# Patient Record
(demographics unavailable — no encounter records)

---

## 2024-10-08 NOTE — REASON FOR VISIT
[Follow Up] : a follow up visit [Patient] : patient [Father] : father Acitretin Pregnancy And Lactation Text: This medication is Pregnancy Category X and should not be given to women who are pregnant or may become pregnant in the future. This medication is excreted in breast milk.

## 2024-11-19 NOTE — PHYSICAL EXAM
[FreeTextEntry1] : Healthy appearing 11-year-old child. Awake, alert, in no acute distress. Pleasant and cooperative. Eyes are clear with no sclera abnormalities. External ears, nose and mouth are clear. Good respiratory effort with no audible wheezing without use of a stethoscope. Ambulates independently. Good mobility following surgery, good coordination and balance.  Spine: Incision appears to be well healed, no active drainage, fluid, or signs of infection. No surrounding erythema. No induration or fluctuance. LE SILT distally. Negative Saurabh. DP 2+. BCR in all digits. The surgical incision site(s) was clean, dry and intact, not erythematous, absent of discharge and not swollen.

## 2024-11-19 NOTE — ASSESSMENT
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 He has been experiencing some back discomfort for approximately 2 months.   The history was obtained today from the child and parent; given the patient's age and/or the child's mental capacity, the history was unreliable and the parent was used as an independent historian.  Radiographs today confirm hardware is in place, no evidence of failure or dislodgement, though there is some worsening in the thoracolumbar curve. Recommendation at this time is to obtain an MRI of his lumbar and thoracic spine to assess the anatomy and to determine if there are issues on the implant pelvis interface. Study was ordered at today's visit and our office will reach out to patient's family with insurance authorization. At this time, he may continue use of NSAIDs/Tylenol prn for pain. Gentle massage or heat may also be helpful. There is also some further stressing of the implant interface on the pelvis because he is kyphotic in that region. Due to his constant forward-pitched posture, we may consider TLSO bracing for correction and to improve his sagittal plane parameters which may help with his pain. We will see him back after the MRI is obtained to do results and depending on what imaging shows, we will consider completing an ~1cm lengthening at that time.  All questions and concerns were addressed today. Parent and patient verbalize understanding and agree with plan of care.  Shameka JOHNS PA-C, have acted as a scribe and documented the above information for Dr. Nunez.

## 2024-11-19 NOTE — HISTORY OF PRESENT ILLNESS
[FreeTextEntry1] : The patient is an 11-year-old male who has a history of congenital kyphoscoliosis, had undergone MAGEC gracia instrumentation, has undergone repeated lengthening, had done well until dislodgement of the right-sided gracia. Because of the dislodgement and potential weakening of the implant, it was felt that he would benefit from replacement of the gracia, revision fixation with VersaTie to prevent dislodgement proximally and good fixation onto the pelvic segment to allow for adequate distraction and correction as he continues to grow. He was indicated for revision because of the significant irritation in the loss of correction and underwent the procedure on 4/1/24. He is 7.5 months out.  Today, Shweta presents today with his father. He has continued to do overall well since surgery, but has been experiencing pain and discomfort in his back for the past 2 months that has been constant. He reports that the pain is all throughout his back. Additionally, patient and father report concern for increased work and shortness of breath over the past 2 weeks. There have been no recent fevers, chills, illnesses. Patient denies any numbness or tingling sensations.

## 2024-11-19 NOTE — REASON FOR VISIT
[Follow Up] : a follow up visit [Patient] : patient [Father] : father [FreeTextEntry1] : back pain s/p right sided MAGEC gracia revision (DOS: 4/1/24).

## 2024-11-19 NOTE — DATA REVIEWED
[de-identified] : My review and interpretation of the radiologic studies: Imaging: X-rays Scoliosis AP/L obtained and independently reviewed in office today 11/19/24 demonstrating maintained hardware. There is no hardware loosening or change in position. There seems to be some evidence of worsening of the deformity in the thoracolumbar spine.  He does show evidence of growth.  His triradiate cartilage have closed.  He is currently a Risser 0, Albright 2.

## 2025-02-20 NOTE — PROCEDURE
[de-identified] : Procedure: Lengthening of the MAGEC rods with the magnetic lengthening external activation device. Indication for procedure: Lengthening of the Magec rods would continue to maintain correction of the spine without needing anesthesia, pain medication, and allow for direct evaluation of the patient's neurological status. Lengthening is necessary to maintain the correction and to prevent the progression of the deformity.  Progression of the deformity would result in significant pulmonary compromise.  Description of the procedure:  MAGEC rods actuators were localized using the x-rays as well the magnetic detectors for confirmation.  The external magnetic lengthening actication device was placed on the patient at the location of the actuator after confirmation with the magnteic detector.  The devices was set for 2 mm.  The Magec rods were then lengthened by 2mm each side.  Neurological status was checked after lengthening after each 1 mm.  Patient tolerated the procedure well without complication. Neurological status was then subsequently checked after completion of the procedure and 15 minutes after the procedure. Pt was observed for 20 minutes following lengthening with no deterioration of neurologic function or any pain.  Patient was fully able to ambulate without any difficulties and was at baseline before discharge from the office.  The right side was lengthened 4 mm and the left side was lengthened 2 mm

## 2025-02-20 NOTE — DATA REVIEWED
[de-identified] : My review and interpretation of the radiologic studies: Imaging: X-rays Scoliosis AP/L obtained and independently reviewed in office today 2/18/25 demonstrating maintained hardware. There is no hardware loosening or change in position. There seems to be some evidence of worsening of the deformity in the thoracolumbar spine.  He does show evidence of growth.  His triradiate cartilage have closed.  He is currently a Risser 0, Albright 2.  MRI T/L Spine 1/28/25 FINDINGS: There is a apparent short angle kyphotic deformity of the mid lumbar spine. There is a pelvic tilt with elevation of the right iliac wing. There is extensive susceptibility artifact from spinal hardware which obscures detail of the lower thoracic spine and the upper portion of the lumbar spine. Images through the included portions of the cervical spine and the visualized portions of the thoracic spine show no syrinx or diastematomyelia. IMPRESSION: Severe image degradation due to presumed growing rods, which obscures bony detail and intraspinal contents.  No evidence of tethered cord in the distal components.  Much of the imaging does not show any significant abnormalities.

## 2025-02-20 NOTE — PROCEDURE
[de-identified] : Procedure: Lengthening of the MAGEC rods with the magnetic lengthening external activation device. Indication for procedure: Lengthening of the Magec rods would continue to maintain correction of the spine without needing anesthesia, pain medication, and allow for direct evaluation of the patient's neurological status. Lengthening is necessary to maintain the correction and to prevent the progression of the deformity.  Progression of the deformity would result in significant pulmonary compromise.  Description of the procedure:  MAGEC rods actuators were localized using the x-rays as well the magnetic detectors for confirmation.  The external magnetic lengthening actication device was placed on the patient at the location of the actuator after confirmation with the magnteic detector.  The devices was set for 2 mm.  The Magec rods were then lengthened by 2mm each side.  Neurological status was checked after lengthening after each 1 mm.  Patient tolerated the procedure well without complication. Neurological status was then subsequently checked after completion of the procedure and 15 minutes after the procedure. Pt was observed for 20 minutes following lengthening with no deterioration of neurologic function or any pain.  Patient was fully able to ambulate without any difficulties and was at baseline before discharge from the office.  The right side was lengthened 4 mm and the left side was lengthened 2 mm

## 2025-02-20 NOTE — ASSESSMENT
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   The history was obtained today from the child and parent; given the patient's age and/or the child's mental capacity, the history was unreliable and the parent was used as an independent historian.  Radiographs today confirm hardware is in place, no evidence of failure or dislodgement, though there is some worsening in the thoracolumbar curve. Recommendation at this time is we will continue with the lengthening.  There is some considerations some point next year for us to have him undergo the final fusion of his lumbar spine.  Current MRI shows no evidence of tethering or significant stenosis in the thoracolumbar spine.  Our plan is to lengthen 5 mm on the right and 2 mm on the left to help balance the shoulders.  All questions and concerns were addressed today. Parent and patient verbalize understanding and agree with plan of care.  This note was partially created using voice recognition software and will inherently be subject to errors including those of syntax and sound alike substitutions which may escape proofreading.  In such instances, the original and intended meaning maybe extrapolated by contextual derivation.  A reasonable effort has been made for proofreading its contents, however errors may still remain. If there are any questions or points of clarification needed please do not hesitate to contact my office.

## 2025-02-20 NOTE — PROCEDURE
[de-identified] : Procedure: Lengthening of the MAGEC rods with the magnetic lengthening external activation device. Indication for procedure: Lengthening of the Magec rods would continue to maintain correction of the spine without needing anesthesia, pain medication, and allow for direct evaluation of the patient's neurological status. Lengthening is necessary to maintain the correction and to prevent the progression of the deformity.  Progression of the deformity would result in significant pulmonary compromise.  Description of the procedure:  MAGEC rods actuators were localized using the x-rays as well the magnetic detectors for confirmation.  The external magnetic lengthening actication device was placed on the patient at the location of the actuator after confirmation with the magnteic detector.  The devices was set for 2 mm.  The Magec rods were then lengthened by 2mm each side.  Neurological status was checked after lengthening after each 1 mm.  Patient tolerated the procedure well without complication. Neurological status was then subsequently checked after completion of the procedure and 15 minutes after the procedure. Pt was observed for 20 minutes following lengthening with no deterioration of neurologic function or any pain.  Patient was fully able to ambulate without any difficulties and was at baseline before discharge from the office.  The right side was lengthened 4 mm and the left side was lengthened 2 mm

## 2025-02-20 NOTE — HISTORY OF PRESENT ILLNESS
[FreeTextEntry1] : The patient is an 11-year-old male who has a history of congenital kyphoscoliosis, had undergone MAGEC gracia instrumentation, has undergone repeated lengthening, had done well until dislodgement of the right-sided gracia. Because of the dislodgement and potential weakening of the implant, it was felt that he would benefit from replacement of the gracia, revision fixation with VersaTie to prevent dislodgement proximally and good fixation onto the pelvic segment to allow for adequate distraction and correction as he continues to grow. He was indicated for revision because of the significant irritation in the loss of correction and underwent the procedure on 4/1/24. He is 10.5 months out.  Today, he is doing well. He returns for lengthening protocol as well as MRI results, which was ordered at last visit.

## 2025-02-20 NOTE — DATA REVIEWED
[de-identified] : My review and interpretation of the radiologic studies: Imaging: X-rays Scoliosis AP/L obtained and independently reviewed in office today 2/18/25 demonstrating maintained hardware. There is no hardware loosening or change in position. There seems to be some evidence of worsening of the deformity in the thoracolumbar spine.  He does show evidence of growth.  His triradiate cartilage have closed.  He is currently a Risser 0, Albright 2.  MRI T/L Spine 1/28/25 FINDINGS: There is a apparent short angle kyphotic deformity of the mid lumbar spine. There is a pelvic tilt with elevation of the right iliac wing. There is extensive susceptibility artifact from spinal hardware which obscures detail of the lower thoracic spine and the upper portion of the lumbar spine. Images through the included portions of the cervical spine and the visualized portions of the thoracic spine show no syrinx or diastematomyelia. IMPRESSION: Severe image degradation due to presumed growing rods, which obscures bony detail and intraspinal contents.  No evidence of tethered cord in the distal components.  Much of the imaging does not show any significant abnormalities.

## 2025-02-20 NOTE — DATA REVIEWED
[de-identified] : My review and interpretation of the radiologic studies: Imaging: X-rays Scoliosis AP/L obtained and independently reviewed in office today 2/18/25 demonstrating maintained hardware. There is no hardware loosening or change in position. There seems to be some evidence of worsening of the deformity in the thoracolumbar spine.  He does show evidence of growth.  His triradiate cartilage have closed.  He is currently a Risser 0, Albright 2.  MRI T/L Spine 1/28/25 FINDINGS: There is a apparent short angle kyphotic deformity of the mid lumbar spine. There is a pelvic tilt with elevation of the right iliac wing. There is extensive susceptibility artifact from spinal hardware which obscures detail of the lower thoracic spine and the upper portion of the lumbar spine. Images through the included portions of the cervical spine and the visualized portions of the thoracic spine show no syrinx or diastematomyelia. IMPRESSION: Severe image degradation due to presumed growing rods, which obscures bony detail and intraspinal contents.  No evidence of tethered cord in the distal components.  Much of the imaging does not show any significant abnormalities.

## 2025-04-17 NOTE — PHYSICAL EXAM
[FreeTextEntry1] : Healthy appearing 12-year-old child. Awake, alert, in no acute distress. Pleasant and cooperative. Eyes are clear with no sclera abnormalities. External ears, nose and mouth are clear. Good respiratory effort with no audible wheezing without use of a stethoscope. Ambulates independently. Good mobility following surgery, good coordination and balance.  Spine: Incision appears to be well healed, no active drainage, fluid, or signs of infection. No surrounding erythema. No induration or fluctuance. Mild discomfort with palpation about the incisions LE SILT distally. Negative Saurabh. DP 2+. BCR in all digits. The surgical incision site(s) was clean, dry and intact, not erythematous, absent of discharge and not swollen.

## 2025-04-17 NOTE — REVIEW OF SYSTEMS
[Change in Activity] : change in activity [No Acute Changes] : No acute changes since previous visit [Itching] : no itching [Sore Throat] : no sore throat [Murmur] : no murmur [Vomiting] : no vomiting

## 2025-04-17 NOTE — PROCEDURE
[de-identified] : Procedure: Lengthening of the MAGEC rods with the magnetic lengthening external activation device. Indication for procedure: Lengthening of the Magec rods would continue to maintain correction of the spine without needing anesthesia, pain medication, and allow for direct evaluation of the patient's neurological status. Lengthening is necessary to maintain the correction and to prevent the progression of the deformity.  Progression of the deformity would result in significant pulmonary compromise.  Description of the procedure:  MAGEC rods actuators were localized using the x-rays as well the magnetic detectors for confirmation.  The external magnetic lengthening actication device was placed on the patient at the location of the actuator after confirmation with the magnteic detector.  The devices was set for 2 mm.  The Magec rods were then lengthened by 2mm each side.  Neurological status was checked after lengthening after each 1 mm.  Patient tolerated the procedure well without complication. Neurological status was then subsequently checked after completion of the procedure and 15 minutes after the procedure. Pt was observed for 20 minutes following lengthening with no deterioration of neurologic function or any pain.  Patient was fully able to ambulate without any difficulties and was at baseline before discharge from the office.  The right side was lengthened 4 mm and the left side was lengthened 2 mm

## 2025-04-17 NOTE — ASSESSMENT
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   The history was obtained today from the child and parent; given the patient's age and/or the child's mental capacity, the history was unreliable and the parent was used as an independent historian.  Radiographs today confirm hardware is in place, no evidence of failure or dislodgement, though there is some worsening in the thoracolumbar curve. Recommendation at this time is we will continue with the lengthening. He underwent lengthening today and tolerated it well.  There is some considerations some point next year for us to have him undergo the final fusion of his lumbar spine.  In regard to his back pain he was measured for a custom TLSO. Brace should be used for comfort. He will follow up in 2 months for repeat clinical evaluation, new scoliosis radiographs and further lengthening.  Our plan is to lengthen 5 mm on the right and 2 mm on the left to help balance the shoulders.  All questions and concerns were addressed today. Parent and patient verbalize understanding and agree with plan of care.  I, Susan Mason, have acted as a scribe and documented the above information for Dr. Nunez  This note was partially created using voice recognition software and will inherently be subject to errors including those of syntax and sound alike substitutions which may escape proofreading.  In such instances, the original and intended meaning maybe extrapolated by contextual derivation.  A reasonable effort has been made for proofreading its contents, however errors may still remain. If there are any questions or points of clarification needed please do not hesitate to contact my office.

## 2025-04-17 NOTE — PROCEDURE
[de-identified] : Procedure: Lengthening of the MAGEC rods with the magnetic lengthening external activation device. Indication for procedure: Lengthening of the Magec rods would continue to maintain correction of the spine without needing anesthesia, pain medication, and allow for direct evaluation of the patient's neurological status. Lengthening is necessary to maintain the correction and to prevent the progression of the deformity.  Progression of the deformity would result in significant pulmonary compromise.  Description of the procedure:  MAGEC rods actuators were localized using the x-rays as well the magnetic detectors for confirmation.  The external magnetic lengthening actication device was placed on the patient at the location of the actuator after confirmation with the magnteic detector.  The devices was set for 2 mm.  The Magec rods were then lengthened by 2mm each side.  Neurological status was checked after lengthening after each 1 mm.  Patient tolerated the procedure well without complication. Neurological status was then subsequently checked after completion of the procedure and 15 minutes after the procedure. Pt was observed for 20 minutes following lengthening with no deterioration of neurologic function or any pain.  Patient was fully able to ambulate without any difficulties and was at baseline before discharge from the office.  The right side was lengthened 4 mm and the left side was lengthened 2 mm

## 2025-04-17 NOTE — DATA REVIEWED
[de-identified] : My review and interpretation of the radiologic studies: Imaging: X-rays Scoliosis AP/L obtained and independently reviewed in office today 4/17/25 demonstrating maintained hardware. There is no hardware loosening or change in position. There seems to be some evidence of worsening of the deformity in the thoracolumbar spine.  He does show evidence of growth.  His triradiate cartilage have closed.  He is currently a Risser 0, Albright 2.  Prior obtained imaging was once again reviewed and is as noted below. MRI T/L Spine 1/28/25 FINDINGS: There is a apparent short angle kyphotic deformity of the mid lumbar spine. There is a pelvic tilt with elevation of the right iliac wing. There is extensive susceptibility artifact from spinal hardware which obscures detail of the lower thoracic spine and the upper portion of the lumbar spine. Images through the included portions of the cervical spine and the visualized portions of the thoracic spine show no syrinx or diastematomyelia. IMPRESSION: Severe image degradation due to presumed growing rods, which obscures bony detail and intraspinal contents.  No evidence of tethered cord in the distal components.  Much of the imaging does not show any significant abnormalities.

## 2025-04-17 NOTE — DATA REVIEWED
[de-identified] : My review and interpretation of the radiologic studies: Imaging: X-rays Scoliosis AP/L obtained and independently reviewed in office today 4/17/25 demonstrating maintained hardware. There is no hardware loosening or change in position. There seems to be some evidence of worsening of the deformity in the thoracolumbar spine.  He does show evidence of growth.  His triradiate cartilage have closed.  He is currently a Risser 0, Albright 2.  Prior obtained imaging was once again reviewed and is as noted below. MRI T/L Spine 1/28/25 FINDINGS: There is a apparent short angle kyphotic deformity of the mid lumbar spine. There is a pelvic tilt with elevation of the right iliac wing. There is extensive susceptibility artifact from spinal hardware which obscures detail of the lower thoracic spine and the upper portion of the lumbar spine. Images through the included portions of the cervical spine and the visualized portions of the thoracic spine show no syrinx or diastematomyelia. IMPRESSION: Severe image degradation due to presumed growing rods, which obscures bony detail and intraspinal contents.  No evidence of tethered cord in the distal components.  Much of the imaging does not show any significant abnormalities.

## 2025-04-17 NOTE — HISTORY OF PRESENT ILLNESS
[FreeTextEntry1] : The patient is a 12-year-old male who has a history of congenital kyphoscoliosis, had undergone MAGEC gracia instrumentation, has undergone repeated lengthening, had done well until dislodgement of the right-sided gracia. Because of the dislodgement and potential weakening of the implant, it was felt that he would benefit from replacement of the gracia, revision fixation with VersaTie to prevent dislodgement proximally and good fixation onto the pelvic segment to allow for adequate distraction and correction as he continues to grow. He was indicated for revision because of the significant irritation in the loss of correction and underwent the procedure on 4/1/24. He reported discomfort about the back and an MRI of the spine was ordered. It was reviewed on 2/18/25 at which time there was no concerning findings and it was recommended that he continue with lengthening. Please see prior clinic notes for additional information.   Today, he continues to report pain about the back which is worsening. He takes tyenol for the discomfort which does not help. He also  He returns for lengthening protocol as well as MRI results, which was ordered at last visit.

## 2025-06-20 NOTE — HISTORY OF PRESENT ILLNESS
[FreeTextEntry1] : The patient is a 12-year-old male who has a history of congenital kyphoscoliosis, had undergone MAGEC gracia instrumentation, has undergone repeated lengthening, had done well until dislodgement of the right-sided gracia. Because of the dislodgement and potential weakening of the implant, it was felt that he would benefit from replacement of the gracia, revision fixation with VersaTie to prevent dislodgement proximally and good fixation onto the pelvic segment to allow for adequate distraction and correction as he continues to grow. He was indicated for revision because of the significant irritation in the loss of correction and underwent the procedure on 4/1/24. He reported discomfort about the back and an MRI of the spine was ordered. It was reviewed on 2/18/25 at which time there was no concerning findings, and it was recommended that he continue with lengthening. Last seen on 4/17/2025. The right side was lengthened 4 mm and the left side was lengthened 2 mm. Please see prior clinic notes for additional information.   Today, he is doing well. The back pain has improved. He uses the custom TLSO as needed. He returns for lengthening protocol.

## 2025-06-20 NOTE — DATA REVIEWED
[de-identified] : My review and interpretation of the radiologic studies: Imaging: X-rays Scoliosis AP/L obtained and independently reviewed in office today 6/19/25 demonstrating maintained hardware. There is no hardware loosening or change in position. He does show evidence of growth.  His triradiate cartilage have closed.  He is currently a Risser 0, Albright 2.  Imaging: X-rays Scoliosis AP/L obtained and independently reviewed in office today 4/17/25 demonstrating maintained hardware. There is no hardware loosening or change in position. There seems to be some evidence of worsening of the deformity in the thoracolumbar spine.  He does show evidence of growth.  His triradiate cartilage have closed.  He is currently a Risser 0, Albright 2.  Prior obtained imaging was once again reviewed and is as noted below. MRI T/L Spine 1/28/25 FINDINGS: There is a apparent short angle kyphotic deformity of the mid lumbar spine. There is a pelvic tilt with elevation of the right iliac wing. There is extensive susceptibility artifact from spinal hardware which obscures detail of the lower thoracic spine and the upper portion of the lumbar spine. Images through the included portions of the cervical spine and the visualized portions of the thoracic spine show no syrinx or diastematomyelia. IMPRESSION: Severe image degradation due to presumed growing rods, which obscures bony detail and intraspinal contents.  No evidence of tethered cord in the distal components.  Much of the imaging does not show any significant abnormalities.

## 2025-06-20 NOTE — ASSESSMENT
[FreeTextEntry1] : Shweta is a 12-year-old male who underwent right sided MAGEC gracia revision 4/1/24.   The history was obtained today from the child and parent; given the patient's age and/or the child's mental capacity, the history was unreliable and the parent was used as an independent historian.  Radiographs today confirm hardware is in place, no evidence of failure or dislodgement. Recommendation at this time is we will continue with the lengthening. He underwent lengthening today and tolerated it well.  There are some considerations some point next year for us to have him undergo the final fusion of his lumbar spine. Continue with custom TLSO. Brace should be used for comfort. He will follow up in 2 months for repeat clinical evaluation, new scoliosis radiographs and further lengthening.  Our plan is to lengthen 5 mm on the right and 2 mm on the left to help balance the shoulders.  All questions and concerns were addressed today. Parent and patient verbalize understanding and agree with plan of care.   Documented by Jennifer Doll acting as a scribe for Dr. Nunez on 06/19/2025.   The above documentation completed by the scribe is an accurate record of both my words and actions.

## 2025-06-20 NOTE — DATA REVIEWED
[de-identified] : My review and interpretation of the radiologic studies: Imaging: X-rays Scoliosis AP/L obtained and independently reviewed in office today 6/19/25 demonstrating maintained hardware. There is no hardware loosening or change in position. He does show evidence of growth.  His triradiate cartilage have closed.  He is currently a Risser 0, Albright 2.  Imaging: X-rays Scoliosis AP/L obtained and independently reviewed in office today 4/17/25 demonstrating maintained hardware. There is no hardware loosening or change in position. There seems to be some evidence of worsening of the deformity in the thoracolumbar spine.  He does show evidence of growth.  His triradiate cartilage have closed.  He is currently a Risser 0, Albright 2.  Prior obtained imaging was once again reviewed and is as noted below. MRI T/L Spine 1/28/25 FINDINGS: There is a apparent short angle kyphotic deformity of the mid lumbar spine. There is a pelvic tilt with elevation of the right iliac wing. There is extensive susceptibility artifact from spinal hardware which obscures detail of the lower thoracic spine and the upper portion of the lumbar spine. Images through the included portions of the cervical spine and the visualized portions of the thoracic spine show no syrinx or diastematomyelia. IMPRESSION: Severe image degradation due to presumed growing rods, which obscures bony detail and intraspinal contents.  No evidence of tethered cord in the distal components.  Much of the imaging does not show any significant abnormalities.

## 2025-06-20 NOTE — PROCEDURE
[de-identified] : Procedure: Lengthening of the MAGEC rods with the magnetic lengthening external activation device. Indication for procedure: Lengthening of the Magec rods would continue to maintain correction of the spine without needing anesthesia, pain medication, and allow for direct evaluation of the patient's neurological status. Lengthening is necessary to maintain the correction and to prevent the progression of the deformity.  Progression of the deformity would result in significant pulmonary compromise.  Description of the procedure:  MAGEC rods actuators were localized using the x-rays as well the magnetic detectors for confirmation.  The external magnetic lengthening actication device was placed on the patient at the location of the actuator after confirmation with the magnteic detector.  The devices was set for 2 mm.  The Magec rods were then lengthened by 2mm each side.  Neurological status was checked after lengthening after each 1 mm.  Patient tolerated the procedure well without complication. Neurological status was then subsequently checked after completion of the procedure and 15 minutes after the procedure. Pt was observed for 20 minutes following lengthening with no deterioration of neurologic function or any pain.  Patient was fully able to ambulate without any difficulties and was at baseline before discharge from the office.  The right side was lengthened 5 mm and the left side was lengthened 2 mm

## 2025-06-20 NOTE — PROCEDURE
[de-identified] : Procedure: Lengthening of the MAGEC rods with the magnetic lengthening external activation device. Indication for procedure: Lengthening of the Magec rods would continue to maintain correction of the spine without needing anesthesia, pain medication, and allow for direct evaluation of the patient's neurological status. Lengthening is necessary to maintain the correction and to prevent the progression of the deformity.  Progression of the deformity would result in significant pulmonary compromise.  Description of the procedure:  MAGEC rods actuators were localized using the x-rays as well the magnetic detectors for confirmation.  The external magnetic lengthening actication device was placed on the patient at the location of the actuator after confirmation with the magnteic detector.  The devices was set for 2 mm.  The Magec rods were then lengthened by 2mm each side.  Neurological status was checked after lengthening after each 1 mm.  Patient tolerated the procedure well without complication. Neurological status was then subsequently checked after completion of the procedure and 15 minutes after the procedure. Pt was observed for 20 minutes following lengthening with no deterioration of neurologic function or any pain.  Patient was fully able to ambulate without any difficulties and was at baseline before discharge from the office.  The right side was lengthened 5 mm and the left side was lengthened 2 mm